# Patient Record
Sex: FEMALE | Race: WHITE
[De-identification: names, ages, dates, MRNs, and addresses within clinical notes are randomized per-mention and may not be internally consistent; named-entity substitution may affect disease eponyms.]

---

## 2020-08-20 ENCOUNTER — HOSPITAL ENCOUNTER (OUTPATIENT)
Dept: HOSPITAL 75 - RAD | Age: 40
End: 2020-08-20
Attending: OBSTETRICS & GYNECOLOGY
Payer: COMMERCIAL

## 2020-08-20 DIAGNOSIS — Z12.31: Primary | ICD-10-CM

## 2020-08-20 PROCEDURE — 77067 SCR MAMMO BI INCL CAD: CPT

## 2020-08-20 PROCEDURE — 77063 BREAST TOMOSYNTHESIS BI: CPT

## 2020-08-21 NOTE — DIAGNOSTIC IMAGING REPORT
INDICATION: Routine screening.



No prior mammograms are available for comparison. This is a

baseline study.



2-D and 3-D bilateral screening mammography was performed with

CAD.



Both breasts are heterogeneously dense, limiting the sensitivity

of mammography. No mass or malignant appearing

microcalcifications are identified. The axillae are unremarkable.



IMPRESSION: BI-RADS Category 1



No mammographic features suspicious for malignancy are

identified.



ACR BI-RADS Category 1: Negative.

Result letter will be mailed to the patient.

Note:  At least 10% of breast cancer is not imaged by

mammography.



Dictated by: 



  Dictated on workstation # [id number]

## 2021-10-15 ENCOUNTER — HOSPITAL ENCOUNTER (OUTPATIENT)
Dept: HOSPITAL 75 - LAB FS | Age: 41
End: 2021-10-15
Attending: OBSTETRICS & GYNECOLOGY
Payer: COMMERCIAL

## 2021-10-15 DIAGNOSIS — E78.00: Primary | ICD-10-CM

## 2021-10-15 LAB
CHOLEST SERPL-MCNC: 137 MG/DL (ref ?–200)
HDLC SERPL-MCNC: 53 MG/DL (ref 40–60)
TRIGL SERPL-MCNC: 229 MG/DL (ref ?–150)
VLDLC SERPL CALC-MCNC: 46 MG/DL (ref 5–40)

## 2021-10-15 PROCEDURE — 80061 LIPID PANEL: CPT

## 2021-10-15 PROCEDURE — 36415 COLL VENOUS BLD VENIPUNCTURE: CPT

## 2021-11-18 ENCOUNTER — HOSPITAL ENCOUNTER (EMERGENCY)
Dept: HOSPITAL 75 - ER FS | Age: 41
Discharge: HOME | End: 2021-11-18
Payer: COMMERCIAL

## 2021-11-18 VITALS — DIASTOLIC BLOOD PRESSURE: 86 MMHG | SYSTOLIC BLOOD PRESSURE: 147 MMHG

## 2021-11-18 VITALS — HEIGHT: 63.78 IN | BODY MASS INDEX: 50.64 KG/M2 | WEIGHT: 293 LBS

## 2021-11-18 DIAGNOSIS — I10: ICD-10-CM

## 2021-11-18 DIAGNOSIS — Z79.899: ICD-10-CM

## 2021-11-18 DIAGNOSIS — N93.8: Primary | ICD-10-CM

## 2021-11-18 LAB
ALBUMIN SERPL-MCNC: 3.7 GM/DL (ref 3.2–4.5)
ALP SERPL-CCNC: 85 U/L (ref 40–136)
ALT SERPL-CCNC: 12 U/L (ref 0–55)
APTT PPP: (no result) S
BACTERIA #/AREA URNS HPF: NEGATIVE /HPF
BASOPHILS # BLD AUTO: 0 10^3/UL (ref 0–0.1)
BASOPHILS NFR BLD AUTO: 0 % (ref 0–10)
BILIRUB SERPL-MCNC: 0.2 MG/DL (ref 0.1–1)
BILIRUB UR QL STRIP: NEGATIVE
BUN/CREAT SERPL: 19
CALCIUM SERPL-MCNC: 8.7 MG/DL (ref 8.5–10.1)
CHLORIDE SERPL-SCNC: 105 MMOL/L (ref 98–107)
CO2 SERPL-SCNC: 21 MMOL/L (ref 21–32)
CREAT SERPL-MCNC: 0.64 MG/DL (ref 0.6–1.3)
EOSINOPHIL # BLD AUTO: 0.1 10^3/UL (ref 0–0.3)
EOSINOPHIL NFR BLD AUTO: 2 % (ref 0–10)
FIBRINOGEN PPP-MCNC: (no result) MG/DL
GFR SERPLBLD BASED ON 1.73 SQ M-ARVRAT: 101 ML/MIN
GLUCOSE SERPL-MCNC: 111 MG/DL (ref 70–105)
GLUCOSE UR STRIP-MCNC: NEGATIVE MG/DL
HCT VFR BLD CALC: 33 % (ref 35–52)
HGB BLD-MCNC: 11.1 G/DL (ref 11.5–16)
KETONES UR QL STRIP: (no result)
LEUKOCYTE ESTERASE UR QL STRIP: (no result)
LYMPHOCYTES # BLD AUTO: 2 X 10^3 (ref 1–4)
LYMPHOCYTES NFR BLD AUTO: 21 % (ref 12–44)
MANUAL DIFFERENTIAL PERFORMED BLD QL: NO
MCH RBC QN AUTO: 29 PG (ref 25–34)
MCHC RBC AUTO-ENTMCNC: 33 G/DL (ref 32–36)
MCV RBC AUTO: 87 FL (ref 80–99)
MONOCYTES # BLD AUTO: 0.9 X 10^3 (ref 0–1)
MONOCYTES NFR BLD AUTO: 9 % (ref 0–12)
NEUTROPHILS # BLD AUTO: 6.5 X 10^3 (ref 1.8–7.8)
NEUTROPHILS NFR BLD AUTO: 68 % (ref 42–75)
NITRITE UR QL STRIP: NEGATIVE
PH UR STRIP: 5 [PH] (ref 5–9)
PLATELET # BLD: 353 10^3/UL (ref 130–400)
PMV BLD AUTO: 9.7 FL (ref 9–12.2)
POTASSIUM SERPL-SCNC: 4.1 MMOL/L (ref 3.6–5)
PROT SERPL-MCNC: 6.5 GM/DL (ref 6.4–8.2)
PROT UR QL STRIP: (no result)
RBC #/AREA URNS HPF: (no result) /HPF
SODIUM SERPL-SCNC: 137 MMOL/L (ref 135–145)
SP GR UR STRIP: 1.02 (ref 1.02–1.02)
SQUAMOUS #/AREA URNS HPF: (no result) /HPF
WBC # BLD AUTO: 9.6 10^3/UL (ref 4.3–11)
WBC #/AREA URNS HPF: (no result) /HPF

## 2021-11-18 PROCEDURE — 87088 URINE BACTERIA CULTURE: CPT

## 2021-11-18 PROCEDURE — 80053 COMPREHEN METABOLIC PANEL: CPT

## 2021-11-18 PROCEDURE — 36415 COLL VENOUS BLD VENIPUNCTURE: CPT

## 2021-11-18 PROCEDURE — 76856 US EXAM PELVIC COMPLETE: CPT

## 2021-11-18 PROCEDURE — 87077 CULTURE AEROBIC IDENTIFY: CPT

## 2021-11-18 PROCEDURE — 85025 COMPLETE CBC W/AUTO DIFF WBC: CPT

## 2021-11-18 PROCEDURE — 84703 CHORIONIC GONADOTROPIN ASSAY: CPT

## 2021-11-18 PROCEDURE — 81000 URINALYSIS NONAUTO W/SCOPE: CPT

## 2021-11-18 NOTE — ED GU-FEMALE
General


Chief Complaint:   - Reproductive


Stated Complaint:  VAGINAL BLEEDING


Nursing Triage Note:  


PT REPORTS SHE HAS HAD A HEAVY PERIOD SINCE MONDAY WITH LARGE CLOTS TODAY.


Source:  patient


Exam Limitations:  no limitations





History of Present Illness


Date Seen by Provider:  Nov 18, 2021


Time Seen by Provider:  12:20


Initial Comments


Patient is a 40-year-old female with a history of PCOS who presents with heavy 

vaginal bleeding with large clots starting 3 days ago.  Patient states she is 

going through a tampon every every hour with 2-3 inch size clots.  She reports g

eneralized fatigue and weakness.  She denies dizziness lightheadedness chest 

pain palpitation shortness of breath.  Reports some mild pelvic cramping.  No 

chest pain palpitations shortness of breath.  No other acute symptoms or 

complaints.  Last menstrual period 1 month ago.  Patient reports regular monthly

cycles with average 5 to 7-day menstrual bleed y


Timing/Duration:  week, intermittent


Location:  other


Radiation:  other


Activities at Onset:  other


Modifying Factors:  Improves With Other


Associated Symptoms:  other





Allergies and Home Medications


Allergies


Coded Allergies:  


     No Known Drug Allergies (Unverified , 3/2/20)





Patient Home Medication List


Home Medication List Reviewed:  Yes


Labetalol HCl (Labetalol HCl) Unknown Strength Tablet, Unknown Dose PO, 

(Reported)


   Entered as Reported by: MEDINA ALVAREZ on 3/2/20 1050


Lisinopril (Lisinopril) Unknown Strength Tablet, Unknown Dose PO DAILY, 

(Reported)


   Entered as Reported by: MEDINA ALVAREZ on 3/2/20 1050


Metformin HCl (Metformin HCl) 500 Mg Tablet, 500 MG PO BID, (Reported)


   Entered as Reported by: MEDINA ALVAREZ on 3/2/20 1050





Review of Systems


Review of Systems


Constitutional:  see HPI


EENTM:  see HPI


Respiratory:  see HPI


Cardiovascular:  see HPI


Gastrointestinal:  see HPI


Genitourinary:  see HPI


Musculoskeletal:  see HPI


Skin:  see HPI


Psychiatric/Neurological:  See HPI


Endocrine:  See HPI


Hematologic/Lymphatic:  See HPI





All Other Systemes Reviewed


Negative Unless Noted:  Yes





Past Medical-Social-Family Hx


Patient Social History


Tobacco Use?:  No


Use of E-Cig and/or Vaping dev:  No


Substance use?:  No


Alcohol Use?:  Yes


Pt feels they are or have been:  No





Immunizations Up To Date


PED Vaccines UTD:  No





Seasonal Allergies


Seasonal Allergies:  No





Past Medical History


Surgeries:  No


Respiratory:  No


Cardiac:  Yes


Hypertension


Neurological:  No


Last Menstrual Period:  Nov 18, 2021


Genitourinary:  No


Gastrointestinal:  No


Musculoskeletal:  No


Endocrine:  No


HEENT:  No


Cancer:  No


Psychosocial:  No


Integumentary:  No


Blood Disorders:  No





Physical Exam


Vital Signs





Vital Signs - First Documented








 11/18/21





 12:07


 


Temp 36.0


 


Pulse 116


 


Resp 18


 


B/P (MAP) 186/112 (136)


 


Pulse Ox 99


 


O2 Delivery Room Air





Capillary Refill : Less Than 3 Seconds


Height, Weight, BMI


Height: '"


Weight: lbs. oz. kg; 51.00 BMI


Method:


General Appearance:  WD/WN, no apparent distress


HEENT:  PERRL/EOMI


Neck:  full range of motion, supple


Cardiovascular:  normal peripheral pulses


Respiratory:  lungs clear, normal breath sounds


Gastrointestinal:  non tender, soft


Extremities:  non-tender


Neurologic/Psychiatric:  alert, oriented x 3


Skin:  normal color





Focused Exam


Sepsis Stage:  Ruled Out





Progress/Results/Core Measures


Suspected Sepsis


SIRS


Temperature: 


Pulse: 116 


Respiratory Rate: 18


 


Laboratory Tests


11/18/21 12:20: White Blood Count 9.6


Blood Pressure 186 /112 


Mean: 136


 


Laboratory Tests


11/18/21 12:20: 


Creatinine 0.64, Platelet Count 353, Total Bilirubin 0.2








Results/Orders


Lab Results





Laboratory Tests








Test


 11/18/21


12:09 11/18/21


12:20 Range/Units


 


 


Urine Color RED H   


 


Urine Clarity TURBID    


 


Urine pH 5.0   5-9  


 


Urine Specific Gravity 1.025 H  1.016-1.022  


 


Urine Protein 3+ H  NEGATIVE  


 


Urine Glucose (UA) NEGATIVE   NEGATIVE  


 


Urine Ketones 1+ H  NEGATIVE  


 


Urine Nitrite NEGATIVE   NEGATIVE  


 


Urine Bilirubin NEGATIVE   NEGATIVE  


 


Urine Urobilinogen 2.0   < = 1.0  MG/DL


 


Urine Leukocyte Esterase 2+ H  NEGATIVE  


 


Urine RBC (Auto) 3+ H  NEGATIVE  


 


Urine RBC TNTC H   /HPF


 


Urine WBC 2-5    /HPF


 


Urine Squamous Epithelial


Cells RARE 


 


  /HPF





 


Urine Crystals NONE    /LPF


 


Urine Bacteria NEGATIVE    /HPF


 


Urine Casts NONE    /LPF


 


Urine Mucus LARGE H   /LPF


 


Urine Culture Indicated YES    


 


White Blood Count


 


 9.6 


 4.3-11.0


10^3/uL


 


Red Blood Count


 


 3.86 


 3.80-5.11


10^6/uL


 


Hemoglobin  11.1 L 11.5-16.0  g/dL


 


Hematocrit  33 L 35-52  %


 


Mean Corpuscular Volume  87  80-99  fL


 


Mean Corpuscular Hemoglobin  29  25-34  pg


 


Mean Corpuscular Hemoglobin


Concent 


 33 


 32-36  g/dL





 


Red Cell Distribution Width  14.6 H 10.0-14.5  %


 


Platelet Count


 


 353 


 130-400


10^3/uL


 


Mean Platelet Volume  9.7  9.0-12.2  fL


 


Immature Granulocyte % (Auto)  0   %


 


Neutrophils (%) (Auto)  68  42-75  %


 


Lymphocytes (%) (Auto)  21  12-44  %


 


Monocytes (%) (Auto)  9  0-12  %


 


Eosinophils (%) (Auto)  2  0-10  %


 


Basophils (%) (Auto)  0  0-10  %


 


Neutrophils # (Auto)  6.5  1.8-7.8  X 10^3


 


Lymphocytes # (Auto)  2.0  1.0-4.0  X 10^3


 


Monocytes # (Auto)  0.9  0.0-1.0  X 10^3


 


Eosinophils # (Auto)


 


 0.1 


 0.0-0.3


10^3/uL


 


Basophils # (Auto)


 


 0.0 


 0.0-0.1


10^3/uL


 


Immature Granulocyte # (Auto)


 


 0.0 


 0.0-0.1


10^3/uL


 


Sodium Level  137  135-145  MMOL/L


 


Potassium Level  4.1  3.6-5.0  MMOL/L


 


Chloride Level  105    MMOL/L


 


Carbon Dioxide Level  21  21-32  MMOL/L


 


Anion Gap  11  5-14  MMOL/L


 


Blood Urea Nitrogen  12  7-18  MG/DL


 


Creatinine


 


 0.64 


 0.60-1.30


MG/DL


 


Estimat Glomerular Filtration


Rate 


 101 


  





 


BUN/Creatinine Ratio  19   


 


Glucose Level  111 H   MG/DL


 


Calcium Level  8.7  8.5-10.1  MG/DL


 


Corrected Calcium  8.9  8.5-10.1  MG/DL


 


Total Bilirubin  0.2  0.1-1.0  MG/DL


 


Aspartate Amino Transf


(AST/SGOT) 


 19 


 5-34  U/L





 


Alanine Aminotransferase


(ALT/SGPT) 


 12 


 0-55  U/L





 


Alkaline Phosphatase  85    U/L


 


Total Protein  6.5  6.4-8.2  GM/DL


 


Albumin  3.7  3.2-4.5  GM/DL








My Orders





Orders - AKOSUA NAVARRO DO


Cbc With Automated Diff (11/18/21 12:16)


Comprehensive Metabolic Panel (11/18/21 12:16)


Urinalysis (11/18/21 12:16)


Urine Pregnancy Bedside (11/18/21 12:16)


Us Pelvic (Non Ob) 97297 (11/18/21 12:36)


Urine Culture (11/18/21 12:09)





Vital Signs/I&O











 11/18/21





 12:07


 


Temp 36.0


 


Pulse 116


 


Resp 18


 


B/P (MAP) 186/112 (136)


 


Pulse Ox 99


 


O2 Delivery Room Air





Capillary Refill : Less Than 3 Seconds








Blood Pressure Mean:                    136











Departure


Communication (Admissions)


Pelvic ultrasound: Large complex vascular endometrial mass





Patient with uterine bleeding with abnormal ultrasound and stable vital 

signs/H&H Case reviewed with on-call gynecologist in detail.  Recommendations 

are for outpatient endometrial biopsy with possible D&C next week.  Patient 

instructed to contact gynecologist office.  Return precautions reviewed.  

Patient verbalizes understanding and agreement discharge instructions prior to 

discharge.





Impression





   Primary Impression:  


   Dysfunctional uterine bleeding


Disposition:  01 HOME, SELF-CARE


Condition:  Against Medical Advice





Departure-Patient Inst.


Decision time for Depature:  13:43


Referrals:  


CAIN MARINO DO


Patient Instructions:  Bleeding Between Periods





Add. Discharge Instructions:  


You were evaluated in the emergency department for abnormal uterine bleeding.  

An ultrasound was performed which shows an endometrial mass.  Please contact Dr. Austin on-call for GYN and schedule an office follow-up next week for possible 

endometrial biopsy versus D&C.  In the meantime if you develop new or worsening 

symptoms, please return to the emergency department.





All discharge instructions reviewed with patient and/or family. Voiced 

understanding.











AKOSUA NAVARRO DO                   Nov 18, 2021 13:24

## 2021-11-18 NOTE — DIAGNOSTIC IMAGING REPORT
INDICATION:  Abnormal uterine bleeding



TECHNIQUE:  Multiple real time gray scale sonographic images were

obtained of the pelvis transabdominally and endovaginally. 



CORRELATION STUDY: None



FINDINGS:

UTERUS:  9.6 x 5.4 x 4.9 cm. 

ENDOMETRIUM:  12 mm.  

There is a heterogeneous predominantly solid appearing mass in

the mid left aspect of the uterus which invades into the

endometrium. This measures approximately 5.7 x 5.1 x 4.2 cm.

Presence of vascular blood flow.



RIGHT OVARY:  Not visualized, perhaps obscured by overlying bowel

gas and/or positional.    

LEFT OVARY:  Left ovary not well visualized, however there does

appear to be 

a predominantly simple appearing cyst in the left adnexa which

measures 4.3 x 3.2 x 2.9 cm.  



No significant free pelvic fluid.



IMPRESSION:

1. Complex vascularized uterine mass measuring nearly 6 cm in

size. This appears to invade into the endometrial canal. May very

well reflect a fibroid uterus. Other masses including neoplasm

are not excluded.

2. Endometrial thickness upper limits of normal.

3. Ovaries are not well visualized. Probable left ovarian or

paraovarian cyst. 

4. Given the overall findings, if further assessment desired, MRI

of the pelvis would be recommended and likely of diagnostic

utility.



Dictated by: 



  Dictated on workstation # [id number]

## 2021-11-24 ENCOUNTER — HOSPITAL ENCOUNTER (OUTPATIENT)
Dept: HOSPITAL 75 - PREOP | Age: 41
Discharge: HOME | End: 2021-11-24
Attending: OBSTETRICS & GYNECOLOGY
Payer: COMMERCIAL

## 2021-11-24 VITALS — WEIGHT: 290.57 LBS | HEIGHT: 64.02 IN | BODY MASS INDEX: 49.61 KG/M2

## 2021-11-24 DIAGNOSIS — Z01.818: Primary | ICD-10-CM

## 2021-11-29 ENCOUNTER — HOSPITAL ENCOUNTER (OUTPATIENT)
Dept: HOSPITAL 75 - SDC | Age: 41
Discharge: HOME | End: 2021-11-29
Attending: OBSTETRICS & GYNECOLOGY
Payer: COMMERCIAL

## 2021-11-29 VITALS — SYSTOLIC BLOOD PRESSURE: 147 MMHG | DIASTOLIC BLOOD PRESSURE: 83 MMHG

## 2021-11-29 VITALS — DIASTOLIC BLOOD PRESSURE: 76 MMHG | SYSTOLIC BLOOD PRESSURE: 168 MMHG

## 2021-11-29 VITALS — HEIGHT: 64.02 IN | WEIGHT: 290.57 LBS | BODY MASS INDEX: 49.61 KG/M2

## 2021-11-29 VITALS — DIASTOLIC BLOOD PRESSURE: 83 MMHG | SYSTOLIC BLOOD PRESSURE: 147 MMHG

## 2021-11-29 VITALS — DIASTOLIC BLOOD PRESSURE: 100 MMHG | SYSTOLIC BLOOD PRESSURE: 139 MMHG

## 2021-11-29 VITALS — SYSTOLIC BLOOD PRESSURE: 152 MMHG | DIASTOLIC BLOOD PRESSURE: 89 MMHG

## 2021-11-29 VITALS — DIASTOLIC BLOOD PRESSURE: 65 MMHG | SYSTOLIC BLOOD PRESSURE: 139 MMHG

## 2021-11-29 VITALS — DIASTOLIC BLOOD PRESSURE: 72 MMHG | SYSTOLIC BLOOD PRESSURE: 121 MMHG

## 2021-11-29 VITALS — SYSTOLIC BLOOD PRESSURE: 157 MMHG | DIASTOLIC BLOOD PRESSURE: 78 MMHG

## 2021-11-29 VITALS — SYSTOLIC BLOOD PRESSURE: 150 MMHG | DIASTOLIC BLOOD PRESSURE: 75 MMHG

## 2021-11-29 VITALS — DIASTOLIC BLOOD PRESSURE: 87 MMHG | SYSTOLIC BLOOD PRESSURE: 129 MMHG

## 2021-11-29 VITALS — DIASTOLIC BLOOD PRESSURE: 70 MMHG | SYSTOLIC BLOOD PRESSURE: 135 MMHG

## 2021-11-29 VITALS — DIASTOLIC BLOOD PRESSURE: 76 MMHG | SYSTOLIC BLOOD PRESSURE: 132 MMHG

## 2021-11-29 DIAGNOSIS — E66.01: ICD-10-CM

## 2021-11-29 DIAGNOSIS — Z90.49: ICD-10-CM

## 2021-11-29 DIAGNOSIS — Z79.891: ICD-10-CM

## 2021-11-29 DIAGNOSIS — N84.0: Primary | ICD-10-CM

## 2021-11-29 DIAGNOSIS — Z79.899: ICD-10-CM

## 2021-11-29 DIAGNOSIS — I10: ICD-10-CM

## 2021-11-29 DIAGNOSIS — Z90.89: ICD-10-CM

## 2021-11-29 LAB
BASOPHILS # BLD AUTO: 0 10^3/UL (ref 0–0.1)
BASOPHILS NFR BLD AUTO: 0 % (ref 0–10)
EOSINOPHIL # BLD AUTO: 0.1 10^3/UL (ref 0–0.3)
EOSINOPHIL NFR BLD AUTO: 2 % (ref 0–10)
HCT VFR BLD CALC: 29 % (ref 35–52)
HGB BLD-MCNC: 9.7 G/DL (ref 11.5–16)
LYMPHOCYTES # BLD AUTO: 1.7 10^3/UL (ref 1–4)
LYMPHOCYTES NFR BLD AUTO: 25 % (ref 12–44)
MANUAL DIFFERENTIAL PERFORMED BLD QL: NO
MCH RBC QN AUTO: 28 PG (ref 25–34)
MCHC RBC AUTO-ENTMCNC: 33 G/DL (ref 32–36)
MCV RBC AUTO: 86 FL (ref 80–99)
MONOCYTES # BLD AUTO: 0.6 10^3/UL (ref 0–1)
MONOCYTES NFR BLD AUTO: 9 % (ref 0–12)
NEUTROPHILS # BLD AUTO: 4.4 10^3/UL (ref 1.8–7.8)
NEUTROPHILS NFR BLD AUTO: 64 % (ref 42–75)
PLATELET # BLD: 408 10^3/UL (ref 130–400)
PMV BLD AUTO: 9.5 FL (ref 9–12.2)
WBC # BLD AUTO: 6.9 10^3/UL (ref 4.3–11)

## 2021-11-29 PROCEDURE — 86850 RBC ANTIBODY SCREEN: CPT

## 2021-11-29 PROCEDURE — 87081 CULTURE SCREEN ONLY: CPT

## 2021-11-29 PROCEDURE — 84703 CHORIONIC GONADOTROPIN ASSAY: CPT

## 2021-11-29 PROCEDURE — 85025 COMPLETE CBC W/AUTO DIFF WBC: CPT

## 2021-11-29 PROCEDURE — 36415 COLL VENOUS BLD VENIPUNCTURE: CPT

## 2021-11-29 PROCEDURE — 86900 BLOOD TYPING SEROLOGIC ABO: CPT

## 2021-11-29 PROCEDURE — 86901 BLOOD TYPING SEROLOGIC RH(D): CPT

## 2021-11-29 NOTE — PROGRESS NOTE-PRE OPERATIVE
Pre-Operative Progress Note


H&P Reviewed


The H&P was reviewed, patient examined and no changes noted.


Date Seen by Provider:  Nov 29, 2021


Time Seen by Provider:  12:00


Date H&P Reviewed:  Nov 29, 2021


Time H&P Reviewed:  12:00


Pre-Operative Diagnosis:  AUB, Intrauterine mass











CAIN MARINO DO            Nov 29, 2021 12:10

## 2021-11-29 NOTE — OPERATIVE REPORT
DATE OF SERVICE:  



PREOPERATIVE DIAGNOSES:

1.  A 41-year-old female with abnormal uterine bleeding.

2.  Intrauterine mass.



POSTOPERATIVE DIAGNOSES:

1.  A 41-year-old female with abnormal uterine bleeding.

2.  Intrauterine mass.



PROCEDURE:

D and C with hysteroscopic partial resection of endometrial intrauterine mass.



SURGEON:

Cain Marino DO



ANESTHESIA:

LMA.



ESTIMATED BLOOD LOSS:

Minimal.



URINE OUTPUT:

50 mL drained at the end of procedure.



FLUIDS:

1200 mL lactated Ringer's solution.



FINDINGS:

A large lower uterine segment distorting mass that appears to be smooth and the

surface consistent with a submucosal fibroid.  Grossly normal appearing

intrauterine cavity, otherwise normal normal-appearing tubal ostia.



SPECIMEN SENT:

Endometrial curettings.



INDICATIONS FOR PROCEDURE:

This 41-year-old female as a patient's followup from the [facility]

Department for heavy bleeding episode.  She was started on Provera initially and

sent home.  Due to her stability, she continued her Provera and her bleeding had

stopped.  She reports that her preoperative consultation that she has been

bleeding for approximately the last 4 weeks, really picked up in the past week. 

I discussed with the patient need for endometrial sampling due to her age and

her body habitus as well as suspicion of this mass, possibly being something

neoplastic or malignant.  Risk of D and C was discussed with the patient in

detail including risk of bleeding, infection, damage to surrounding structures

including the uterus itself.  After all of her questions were answered, consent

was obtained, the patient was taken to the operating room.



OPERATIVE REPORT IN DETAIL:

Once in the operating room and anesthesia was found to be adequate, she was

placed in the dorsal lithotomy position, prepped and draped in normal sterile

fashion.  A timeout was performed.  Weighted speculum was inserted into the

patient's vagina.  Right angle retractor was used to visualize the cervix, which

was grasped at 12 o'clock position using a long Allis clamp.  I then performed

paracervical block at 3 and 9 o'clock positions on the cervix.  Care was taken

to aspirate for injecting 0.25% Marcaine with 5 mL are injected into each site. 

I then gently sound uterine cavity and was found to be approximately 8 to 9 cm. 

I then advanced a hysteroscope with normal saline with visual medium into the

uterine cavity.  Initially, the uterine cavity appears to be grossly normal

until I reached the level of the lower uterine segment and entered into the

cervix, at which point, there is a large space-occupying intrauterine mass

coming off of the anterior left endometrial endocervical attachment point, at

which point I attempt to begin to resect this; however, due to the fluid

management system, unable to completely resect, I am able to collect a tissue

sample from the intrauterine mass, which I suspect of the fibroid by its gross

appearance.  I then performed a gentle curettage of the remainder of the

endometrial cavity.  All of these are collected together and sent as endometrial

curettings.  I removed all the instruments from the patient's vagina at the end

of the procedure and the bladder was emptied using straight catheterization. 

Lap and sponge counts were correct at the end of the procedure.  Instrument

counts correct as well.  The patient tolerated the procedure well and sent to

recovery in stable condition.





Job ID: [id number]

DocumentID: [id number]

Dictated Date:  11/29/2021 12:50:40

Transcription Date: 11/29/2021 19:27:43

Dictated By: CAIN MARINO DO

## 2021-11-29 NOTE — DISCHARGE INST-WOMEN'S SERVICE
Discharge Inst-Women's Serv


Depart Medication/Instructions


New, Converted or Re-Newed RX:  Other (take at home meds)


Problems Reviewed?:  Yes





Consults/Follow Up


Additional Follow Up:  Yes





Activity


Activity:  Activity as Tolerated


Driving Instructions:  No Driving for 1 Week


NO SMOKING:  NO SMOKING


Nothing Inside Vagina:  No Douching, No [place], No Tampons





Diet


Discharge Diet:  No Restrictions


Symptoms to Report to :  Bleeding Excessive, Pain Increased, Fever Over 101 

Degrees F, Vaginal Bleeding Increase, Questions/Concerns


For Any Problems or Questions:  Contact Your Physician











CAIN MARINO DO            Nov 29, 2021 12:13

## 2021-12-13 ENCOUNTER — HOSPITAL ENCOUNTER (OUTPATIENT)
Dept: HOSPITAL 75 - PREOP | Age: 41
LOS: 2 days | Discharge: HOME | End: 2021-12-15
Attending: OBSTETRICS & GYNECOLOGY
Payer: COMMERCIAL

## 2021-12-13 VITALS — HEIGHT: 63.98 IN | BODY MASS INDEX: 49.61 KG/M2 | WEIGHT: 290.57 LBS

## 2021-12-13 DIAGNOSIS — Z01.818: Primary | ICD-10-CM

## 2021-12-20 ENCOUNTER — HOSPITAL ENCOUNTER (OUTPATIENT)
Dept: HOSPITAL 75 - SDC | Age: 41
Discharge: HOME | End: 2021-12-20
Attending: OBSTETRICS & GYNECOLOGY
Payer: COMMERCIAL

## 2021-12-20 VITALS — SYSTOLIC BLOOD PRESSURE: 154 MMHG | DIASTOLIC BLOOD PRESSURE: 91 MMHG

## 2021-12-20 VITALS — SYSTOLIC BLOOD PRESSURE: 150 MMHG | DIASTOLIC BLOOD PRESSURE: 81 MMHG

## 2021-12-20 VITALS — SYSTOLIC BLOOD PRESSURE: 164 MMHG | DIASTOLIC BLOOD PRESSURE: 79 MMHG

## 2021-12-20 VITALS — SYSTOLIC BLOOD PRESSURE: 148 MMHG | DIASTOLIC BLOOD PRESSURE: 99 MMHG

## 2021-12-20 VITALS — DIASTOLIC BLOOD PRESSURE: 88 MMHG | SYSTOLIC BLOOD PRESSURE: 164 MMHG

## 2021-12-20 VITALS — DIASTOLIC BLOOD PRESSURE: 87 MMHG | SYSTOLIC BLOOD PRESSURE: 154 MMHG

## 2021-12-20 VITALS — SYSTOLIC BLOOD PRESSURE: 163 MMHG | DIASTOLIC BLOOD PRESSURE: 95 MMHG

## 2021-12-20 VITALS — HEIGHT: 63.98 IN | BODY MASS INDEX: 49.61 KG/M2 | WEIGHT: 290.57 LBS

## 2021-12-20 VITALS — DIASTOLIC BLOOD PRESSURE: 95 MMHG | SYSTOLIC BLOOD PRESSURE: 146 MMHG

## 2021-12-20 VITALS — SYSTOLIC BLOOD PRESSURE: 172 MMHG | DIASTOLIC BLOOD PRESSURE: 80 MMHG

## 2021-12-20 VITALS — DIASTOLIC BLOOD PRESSURE: 886 MMHG | SYSTOLIC BLOOD PRESSURE: 141 MMHG

## 2021-12-20 DIAGNOSIS — Z79.899: ICD-10-CM

## 2021-12-20 DIAGNOSIS — N73.6: ICD-10-CM

## 2021-12-20 DIAGNOSIS — E66.9: ICD-10-CM

## 2021-12-20 DIAGNOSIS — D25.0: Primary | ICD-10-CM

## 2021-12-20 DIAGNOSIS — Z79.891: ICD-10-CM

## 2021-12-20 DIAGNOSIS — G47.33: ICD-10-CM

## 2021-12-20 DIAGNOSIS — I10: ICD-10-CM

## 2021-12-20 DIAGNOSIS — Z98.890: ICD-10-CM

## 2021-12-20 DIAGNOSIS — N80.0: ICD-10-CM

## 2021-12-20 DIAGNOSIS — G43.909: ICD-10-CM

## 2021-12-20 LAB
ALBUMIN SERPL-MCNC: 3.8 GM/DL (ref 3.2–4.5)
ALP SERPL-CCNC: 86 U/L (ref 40–136)
ALT SERPL-CCNC: 23 U/L (ref 0–55)
BASOPHILS # BLD AUTO: 0 10^3/UL (ref 0–0.1)
BASOPHILS NFR BLD AUTO: 0 % (ref 0–10)
BILIRUB SERPL-MCNC: 0.5 MG/DL (ref 0.1–1)
BUN/CREAT SERPL: 11
CALCIUM SERPL-MCNC: 8.3 MG/DL (ref 8.5–10.1)
CHLORIDE SERPL-SCNC: 106 MMOL/L (ref 98–107)
CO2 SERPL-SCNC: 21 MMOL/L (ref 21–32)
CREAT SERPL-MCNC: 0.72 MG/DL (ref 0.6–1.3)
EOSINOPHIL # BLD AUTO: 0.1 10^3/UL (ref 0–0.3)
EOSINOPHIL NFR BLD AUTO: 2 % (ref 0–10)
GFR SERPLBLD BASED ON 1.73 SQ M-ARVRAT: 88 ML/MIN
GLUCOSE SERPL-MCNC: 103 MG/DL (ref 70–105)
HCT VFR BLD CALC: 31 % (ref 35–52)
HGB BLD-MCNC: 9.6 G/DL (ref 11.5–16)
LYMPHOCYTES # BLD AUTO: 1.3 10^3/UL (ref 1–4)
LYMPHOCYTES NFR BLD AUTO: 21 % (ref 12–44)
MANUAL DIFFERENTIAL PERFORMED BLD QL: NO
MCH RBC QN AUTO: 27 PG (ref 25–34)
MCHC RBC AUTO-ENTMCNC: 32 G/DL (ref 32–36)
MCV RBC AUTO: 85 FL (ref 80–99)
MONOCYTES # BLD AUTO: 0.5 10^3/UL (ref 0–1)
MONOCYTES NFR BLD AUTO: 8 % (ref 0–12)
NEUTROPHILS # BLD AUTO: 4.3 10^3/UL (ref 1.8–7.8)
NEUTROPHILS NFR BLD AUTO: 68 % (ref 42–75)
PLATELET # BLD: 361 10^3/UL (ref 130–400)
PMV BLD AUTO: 9.5 FL (ref 9–12.2)
POTASSIUM SERPL-SCNC: 3.9 MMOL/L (ref 3.6–5)
PROT SERPL-MCNC: 6.7 GM/DL (ref 6.4–8.2)
SODIUM SERPL-SCNC: 137 MMOL/L (ref 135–145)
WBC # BLD AUTO: 6.3 10^3/UL (ref 4.3–11)

## 2021-12-20 PROCEDURE — 86850 RBC ANTIBODY SCREEN: CPT

## 2021-12-20 PROCEDURE — 84703 CHORIONIC GONADOTROPIN ASSAY: CPT

## 2021-12-20 PROCEDURE — 36415 COLL VENOUS BLD VENIPUNCTURE: CPT

## 2021-12-20 PROCEDURE — 80053 COMPREHEN METABOLIC PANEL: CPT

## 2021-12-20 PROCEDURE — 86901 BLOOD TYPING SEROLOGIC RH(D): CPT

## 2021-12-20 PROCEDURE — 86900 BLOOD TYPING SEROLOGIC ABO: CPT

## 2021-12-20 PROCEDURE — 94664 DEMO&/EVAL PT USE INHALER: CPT

## 2021-12-20 PROCEDURE — 87081 CULTURE SCREEN ONLY: CPT

## 2021-12-20 PROCEDURE — 85025 COMPLETE CBC W/AUTO DIFF WBC: CPT

## 2021-12-20 RX ADMIN — SODIUM CHLORIDE, SODIUM LACTATE, POTASSIUM CHLORIDE, AND CALCIUM CHLORIDE PRN MLS/HR: 600; 310; 30; 20 INJECTION, SOLUTION INTRAVENOUS at 11:56

## 2021-12-20 RX ADMIN — SODIUM CHLORIDE, SODIUM LACTATE, POTASSIUM CHLORIDE, AND CALCIUM CHLORIDE PRN MLS/HR: 600; 310; 30; 20 INJECTION, SOLUTION INTRAVENOUS at 09:00

## 2021-12-20 RX ADMIN — SODIUM CHLORIDE, SODIUM LACTATE, POTASSIUM CHLORIDE, AND CALCIUM CHLORIDE PRN MLS/HR: 600; 310; 30; 20 INJECTION, SOLUTION INTRAVENOUS at 10:13

## 2021-12-20 NOTE — DISCHARGE INST-WOMEN'S SERVICE
Discharge Inst-Women's Serv


Depart Medication/Instructions


New, Converted or Re-Newed RX:  Transmitted to Pharmacy


Problems Reviewed?:  Yes





Consults/Follow Up


Additional Follow Up:  Yes


Orders/Referrals


Dr. Ramírez in 7-10 days  and in 8 weeks





Activity


Activity:  Activity as Tolerated


Driving Instructions:  No Driving for 1 Week


NO SMOKING:  NO SMOKING


Nothing Inside Vagina:  No Douching, No [place], No Tampons





Diet


Discharge Diet:  No Restrictions


Symptoms to Report to :  Bleeding Excessive, Pain Increased, Fever Over 101 

Degrees F, Vaginal Bleeding Increase, Questions/Concerns


For Any Problems or Questions:  Contact Your Physician





Skin/Wound Care


Infection Signs and Symptoms:  Increased Redness, Foul Odor of Wound, Increased 

Drainage, Skin Itchy or Has a Rash, Increased Swelling, Temperature Above 101  F


Operative Area Clean and Dry:  Keep Incision Clean/Dry


Stitches/Staples/Dermabond:  Dermabond, Care of Stitches


Bathing Instructions:  CAIN Cevallos DO            Dec 20, 2021 09:16

## 2021-12-20 NOTE — PROGRESS NOTE-PRE OPERATIVE
Pre-Operative Progress Note


H&P Reviewed


The H&P was reviewed, patient examined and no changes noted.


Date Seen by Provider:  Dec 20, 2021


Time Seen by Provider:  09:00


Date H&P Reviewed:  Dec 20, 2021


Time H&P Reviewed:  09:00


Pre-Operative Diagnosis:  AUB, Fibroid uterus, BMI 49











CAIN MARINO DO            Dec 20, 2021 09:11

## 2021-12-21 NOTE — ANESTHESIA-GENERAL POST-OP
General


Patient Condition


Mental Status/LOC:  Same as Preop


Cardiovascular:  Satisfactory


Nausea/Vomiting:  Absent


Respiratory:  Satisfactory


Pain:  Controlled


Complications:  Absent





Post Op Complications


Complications


None





Follow Up Care/Instructions


Patient Instructions


None needed.





Anesthesia/Patient Condition


Patient Condition


Patient is already discharged to home but she was doing well, no complaints, 

stable vital signs, no apparent adverse anesthesia problems prior to her 

discharge per nursing staff.











BERNARDA KRISHNA DO         Dec 21, 2021 09:48

## 2021-12-21 NOTE — OPERATIVE REPORT
DATE OF SERVICE:  



PREOPERATIVE DIAGNOSES:

1.  A 41-year-old female with fibroid uterus.

2.  Menorrhagia.

3.  Dysmenorrhea.



POSTOPERATIVE DIAGNOSES:

1.  A 41-year-old female with fibroid uterus.

2.  Menorrhagia.

3.  Dysmenorrhea.



PROCEDURE:

Robotic-assisted total laparoscopic hysterectomy with bilateral salpingectomy, 
weight > 250 gms.

Extensive lysis of adhesions.





SURGEON:

Saurav Ramírez DO



ASSISTANT:

Gabriela Ambrocio DNP, was necessary for manipulation and retraction throughout

the procedure.



ANESTHESIA:

General endotracheal.



ESTIMATED BLOOD LOSS:

50 mL.



URINE OUTPUT:

50 mL clear at the end of the procedure.



FLUIDS:

1800 mL lactated Ringer's solution.



FINDINGS:

A bulky and hyperemic appearing uterus.  Grossly normal appearing bilateral

fallopian tubes and ovaries, curtain adhesions of the omentum to the anterior

abdominal wall from prior cesareans.



SPECIMEN SENT:

Uterus, bilateral fallopian tubes.



INDICATIONS FOR PROCEDURE:

This 41-year-old female is a patient who had sought care in my office for heavy

abnormal bleeding that she has had for years.  She has had multiple medical

comorbidities including obesity, which prompted us to first do a D and C for

endometrial sampling, which was benign, but showed evidence of fibroids.  I

discussed with the patient more conservative measures; however, due to her

multiple medical comorbidities, some of those measures were inappropriate and

contraindicated.  I then discussed also with the patient definitive therapy in

the form of hysterectomy.  Risks of the procedure were discussed with the

patient in detail including risk of bleeding, infection, damage to surrounding

structures including, but not limited to bowel, bladder, ureter, kidneys,

possible need for reoperation, postoperative complications that may occur, risk

from anesthesia, recovery timeframe and even death.  After everything was

discussed with the patient in detail, consent was obtained in the preoperative

area, the patient was taken to the operating room.



OPERATIVE REPORT IN DETAIL:

Once in the operating room, general anesthesia was found to be adequate, placed

in dorsal lithotomy position, prepped and draped in normal sterile fashion where

a timeout was performed.  Abernathy catheter was placed using sterile technique.  A

weighted speculum inserted to the patient's vagina.  Right angle retractor was

used to visualize the cervix, which was grasped at 12 o'clock position using a

long Allis clamp.  I then a 0 Vicryl suture was then placed anterior lip of the

cervix and the suture was then used as my retraction.  I then gently sound the

uterine cavity, depth was found to be 8 cm.  I selected an 8 cm Tamiko uterine

manipulator tip and a 3.5 cm colpotomy ring.  The manipulator tip was advanced

into the uterus with the balloon was deployed and the colpotomy ring was

advanced around the vaginal fornix after which all the other instruments were

removed from the patient's vagina, performed change of gloves, turned my

attention to the abdomen, where subcostally at the midclavicular line, I

introduced the Veress needle through the skin until intraperitoneal placement

was confirmed using a saline drop test.  An opening pressure of 6 mmHg was

noted, proceeded to maximum pressure of insufflation using CO2 gas of 15 mmHg,

at which point I infiltrated the infraumbilical area using 0.25% Marcaine to

make an 8 mm incision with a knife and directed a blunt laparoscopic da Mary Jo

camera trocar through this incision until intraperitoneal placement was again

confirmed using the da Mary Jo laparoscope.  There was no evidence of damage upon

my entry site; however, there are curtain adhesions of the lower abdomen.  The

upper abdomen appears grossly normal and clean as if no prior operations.  The

Veress is identified and removed.  I then placed two lateral trocars using both

8 mm trocars, they are approximately 10 cm lateral to my infraumbilical trocar. 

Both these were placed under direct visualization of laparoscope, which requires

some navigating around these curtain adhesions.  Once both of these trocars were

in place, I bring in the da Mary Jo robot and docked in appropriate fashion. 

After the patient placed in steep Trendelenburg, I placed the SynchroSeal in the

left hand and monopolar chuy in the right hand.  I began by taking down the

anterior abdominal wall adhesions.  This was both done with sharp dissection,

blunt dissection and some cautery to achieve hemostasis as adhesions were taken

down.  Once they were all taken down, I am able to visualize all of the  pelvic

anatomy as defined in my findings above.  I performed the following dissection

bilaterally starting at the uteroovarian ligament, which I sealed and transected

using the SynchroSeal device.  I then created a window in the mesosalpinx and

dissect down this laterally using the SynchroSeal device, amputated fallopian 
tube from its

surrounding blood supply.  I then grasped the round ligament, which I sealed and

transected using the SynchroSeal device.  I then grasped the entire broad

ligament, which I sealed and transected using the SynchroSeal device down to the

level of the lower uterine segment, at which point I  the anterior and

posterior leaves of the broad ligament.  Anterior leaflet was taken around the

anterior vaginal fornix.  Posterior leaflet was taken around to the posterior

vaginal fornix.  This allows me to skeletonize the uterine vessels laterally,

which I sealed and transected using the SynchroSeal device.  I then created a

colpotomy at 12 o'clock position using monopolar chuy and took this

circumferentially around the vaginal fornix amputating the cervix away from the

vagina.  The entire specimen was then removed through the vagina.  I then

proceeded with closing the lateral vaginal apices of the vaginal cuff using 2-0

Vicryl suture in a figure-of-eight fashion colposuspending into the uterosacral

ligaments.  I closed the remainder of the vaginal cuff using 2-0 V-Loc in a

running fashion, after which there was no active bleeding noted from any of my

dissection planes.  I then placed Surgiflo hemostatic agent over all my planes

of dissection after I undocked the da Mary Jo robot and proceeded with remainder

of the case laparoscopically.  I also copiously irrigated the pelvis using

normal saline.  Once the Surgicel was in place, I had the patient taken out of

steep Trendelenburg where I removed the lateral trocars under direct

visualization of the laparoscope and infraumbilical trocars left in place to

release insufflation and introduce 10 mL of 0.25% Marcaine into peritoneal

cavity for postoperative pain management.  I then removed this trocar as well. 

The skin reapproximated using 4-0 Monocryl and interrupted subcuticular

stitches.  Dermabond was applied to the incisions and Band-Aids were placed over

the incisions as well.  Abernathy catheter was left in place.  The patient tolerated

the procedure well and sent to recovery area in stable condition.  Lap and

sponge counts were correct at the end of the procedure.  Instrument counts

correct as well.  Two grams of Ancef, 500 mg of Flagyl were given preoperatively

for infection prophylaxis.





Job ID: [id number]

DocumentID: [id number]

Dictated Date:  12/20/2021 11:45:26

Transcription Date: 12/20/2021 22:35:25

Dictated By: DO MARGOT ESPINOSA